# Patient Record
Sex: FEMALE | Race: WHITE | NOT HISPANIC OR LATINO | URBAN - METROPOLITAN AREA
[De-identification: names, ages, dates, MRNs, and addresses within clinical notes are randomized per-mention and may not be internally consistent; named-entity substitution may affect disease eponyms.]

---

## 2023-07-25 ENCOUNTER — TRANSFERRED RECORDS (OUTPATIENT)
Dept: HEALTH INFORMATION MANAGEMENT | Facility: CLINIC | Age: 43
End: 2023-07-25
Payer: COMMERCIAL

## 2023-07-27 ENCOUNTER — TRANSCRIBE ORDERS (OUTPATIENT)
Dept: OTHER | Age: 43
End: 2023-07-27

## 2023-07-27 ENCOUNTER — MEDICAL CORRESPONDENCE (OUTPATIENT)
Dept: HEALTH INFORMATION MANAGEMENT | Facility: CLINIC | Age: 43
End: 2023-07-27
Payer: COMMERCIAL

## 2023-07-27 DIAGNOSIS — M51.379 DDD (DEGENERATIVE DISC DISEASE), LUMBOSACRAL: ICD-10-CM

## 2023-07-27 DIAGNOSIS — M54.50 LOW BACK PAIN: Primary | ICD-10-CM

## 2023-11-17 ENCOUNTER — THERAPY VISIT (OUTPATIENT)
Dept: PHYSICAL THERAPY | Facility: CLINIC | Age: 43
End: 2023-11-17
Payer: COMMERCIAL

## 2023-11-17 DIAGNOSIS — G89.29 CHRONIC BILATERAL LOW BACK PAIN WITHOUT SCIATICA: Primary | ICD-10-CM

## 2023-11-17 DIAGNOSIS — M54.50 CHRONIC BILATERAL LOW BACK PAIN WITHOUT SCIATICA: Primary | ICD-10-CM

## 2023-11-17 PROCEDURE — 97110 THERAPEUTIC EXERCISES: CPT | Mod: GP | Performed by: PHYSICAL THERAPIST

## 2023-11-17 PROCEDURE — 97161 PT EVAL LOW COMPLEX 20 MIN: CPT | Mod: GP | Performed by: PHYSICAL THERAPIST

## 2023-11-17 NOTE — PROGRESS NOTES
PHYSICAL THERAPY EVALUATION  Type of Visit: Evaluation    See electronic medical record for Abuse and Falls Screening details.    Subjective       Presenting condition or subjective complaint: Low back pain for several years and got worse in February.The pain is bad when she wakes up in the morning and feels stiff and makes it hard to get dressed in the morning. She has tried different yoga positions and stretches and those don't seem to help.   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   Prior therapy history for the same diagnosis, illness or injury: No      Prior Level of Function  Transfers: Independent  Ambulation: Independent  ADL: Independent  IADL:     Living Environment  Social support: With a significant other or spouse   Type of home: 2-story   Stairs to enter the home: Yes 5 Is there a railing: Yes   Ramp: No   Stairs inside the home: Yes       Help at home: None  Equipment owned:       Employment: Yes Nurse  Hobbies/Interests: yoga, reading, kids activites    Patient goals for therapy: Bend over to get dressed, sit in softer chairs    Pain assessment: Pain present     Objective   LUMBAR SPINE EVALUATION  PAIN: Pain Level at Rest: 3/10  Pain Level with Use: 8/10  Pain Location: lumbar spine  Pain Quality: Aching  Pain Frequency: constant  INTEGUMENTARY (edema, incisions):   POSTURE:   GAIT:   Weightbearing Status:   Assistive Device(s):   Gait Deviations:   BALANCE/PROPRIOCEPTION:   WEIGHTBEARING ALIGNMENT:   NON-WEIGHTBEARING ALIGNMENT:    ROM:   (Degrees) Left AROM Left PROM  Right AROM Right PROM   Hip Flexion  WNL  WNL   Hip Extension       Hip Abduction       Hip Adduction       Hip Internal Rotation  WNL  WNL   Hip External Rotation  WNL  WNL   Knee Flexion       Knee Extension       Lumbar Side glide NIL pain at end  "of movement NIL pain throughout movement   Lumbar Flexion Hands to ankles, reproduces pain   Lumbar Extension Mod restriction \"feels good\"   Pain:   End feel:   PELVIC/SI SCREEN:   STRENGTH:     MYOTOMES:    Left Right   T12-L3 (Hip Flexion) 5 5   L2-4 (Quads)  5 5   L4 (Ankle DF) 5 5   L5 (Great Toe Ext) 5 5   S1 (Toe Raise) 5 5     DTR S:   CORD SIGNS:     NEURAL TENSION:   FLEXIBILITY:   LUMBAR/HIP Special Tests:    Left Right   MIKEY     FADIR/Labrum/JG     Femoral Nerve     Martha's     Piriformis     Quadrant Testing     SLR Positive Positive   Slump     Stork with Extension     [place]             PELVIS/SI SPECIAL TESTS:   FUNCTIONAL TESTS:   PALPATION:   SPINAL SEGMENTAL CONCLUSIONS:       Assessment & Plan   CLINICAL IMPRESSIONS  Medical Diagnosis: Low back pain  DDD (degenerative disc disease), lumbosacral    Treatment Diagnosis: chronic central low back pain without sciatica   Impression/Assessment: Patient is a 43 year old female with low back pain complaints.  The following significant findings have been identified: Pain and Decreased ROM/flexibility. These impairments interfere with their ability to perform self care tasks, work tasks, and household chores as compared to previous level of function.     Clinical Decision Making (Complexity):  Clinical Presentation: Stable/Uncomplicated  Clinical Presentation Rationale: based on medical and personal factors listed in PT evaluation  Clinical Decision Making (Complexity): Low complexity    PLAN OF CARE  Treatment Interventions:  Interventions: Manual Therapy, Neuromuscular Re-education, Therapeutic Activity, Therapeutic Exercise    Long Term Goals     PT Goal 1  Goal Identifier: bending  Goal Description: pt will have unrestricted bending first thing in the morning 1/10 PL or less  Rationale: to maximize safety and independence with performance of ADLs and functional tasks  Goal Progress: flexion hands to toes with 6/10PL.      Frequency of Treatment: 1 x/ " week  Duration of Treatment: 8 weeks    Recommended Referrals to Other Professionals:   Education Assessment:   Learner/Method: Patient;Demonstration;Pictures/Video;No Barriers to Learning    Risks and benefits of evaluation/treatment have been explained.   Patient/Family/caregiver agrees with Plan of Care.     Evaluation Time:     PT Eval, Low Complexity Minutes (38321): 25       Signing Clinician: José Antonio Lozano PT

## 2023-11-30 ENCOUNTER — THERAPY VISIT (OUTPATIENT)
Dept: PHYSICAL THERAPY | Facility: CLINIC | Age: 43
End: 2023-11-30
Payer: COMMERCIAL

## 2023-11-30 DIAGNOSIS — G89.29 CHRONIC BILATERAL LOW BACK PAIN WITHOUT SCIATICA: Primary | ICD-10-CM

## 2023-11-30 DIAGNOSIS — M54.50 CHRONIC BILATERAL LOW BACK PAIN WITHOUT SCIATICA: Primary | ICD-10-CM

## 2023-11-30 PROCEDURE — 97110 THERAPEUTIC EXERCISES: CPT | Mod: GP | Performed by: PHYSICAL THERAPIST

## 2023-12-07 ENCOUNTER — THERAPY VISIT (OUTPATIENT)
Dept: PHYSICAL THERAPY | Facility: CLINIC | Age: 43
End: 2023-12-07
Payer: COMMERCIAL

## 2023-12-07 DIAGNOSIS — G89.29 CHRONIC BILATERAL LOW BACK PAIN WITHOUT SCIATICA: Primary | ICD-10-CM

## 2023-12-07 DIAGNOSIS — M54.50 CHRONIC BILATERAL LOW BACK PAIN WITHOUT SCIATICA: Primary | ICD-10-CM

## 2023-12-07 PROCEDURE — 97110 THERAPEUTIC EXERCISES: CPT | Mod: GP | Performed by: PHYSICAL THERAPIST

## 2023-12-15 ENCOUNTER — THERAPY VISIT (OUTPATIENT)
Dept: PHYSICAL THERAPY | Facility: CLINIC | Age: 43
End: 2023-12-15
Payer: COMMERCIAL

## 2023-12-15 DIAGNOSIS — M54.42 ACUTE BILATERAL LOW BACK PAIN WITH LEFT-SIDED SCIATICA: Primary | ICD-10-CM

## 2023-12-15 PROBLEM — M54.50 CHRONIC LOW BACK PAIN WITHOUT SCIATICA: Status: RESOLVED | Noted: 2023-11-17 | Resolved: 2023-12-15

## 2023-12-15 PROBLEM — G89.29 CHRONIC LOW BACK PAIN WITHOUT SCIATICA: Status: RESOLVED | Noted: 2023-11-17 | Resolved: 2023-12-15

## 2023-12-15 PROCEDURE — 97110 THERAPEUTIC EXERCISES: CPT | Mod: GP | Performed by: PHYSICAL THERAPIST

## 2023-12-15 NOTE — PROGRESS NOTES
12/15/23 0500   Appointment Info   Signing clinician's name / credentials José Antonio Lozano, JONI, Rosamaria Jaimes, SPT   Total/Authorized Visits 8 E&T   Visits Used 4   Medical Diagnosis Low back pain  DDD (degenerative disc disease), lumbosacral   PT Tx Diagnosis chronic central low back pain without sciatica   Progress Note/Certification   Onset of illness/injury or Date of Surgery 02/01/23   Therapy Frequency 1 x/ week   Predicted Duration 8 weeks   Progress Note Due Date 01/12/24   Progress Note Completed Date 11/17/23   Supervision   Student Supervision Therapy services provided with the co-signing licensed therapist guiding and directing the services, and providing the skilled judgement and assessment throughout the session       Present No   PT Goal 1   Goal Identifier bending   Goal Description pt will have unrestricted bending first thing in the morning 1/10 PL or less   Rationale to maximize safety and independence with performance of ADLs and functional tasks   Goal Progress flexion hands to floor with 1/10 PL. in morning,   Target Date 12/15/23   Date Met 12/15/23   Subjective Report   Subjective Report Pt reports feeling better throughout the day and being able to bend over with no pain. She is still stiff in the morning with some discomfort.   Objective Measures   Objective Measures Objective Measure 1;Objective Measure 2   Objective Measure 1   Objective Measure lumbar ROM   Details LROM FIS nil loss pain present which increases throug flexion force EIS MOD loss SGIS (R) nil loss (L) min loss   Treatment Interventions (PT)   Interventions Therapeutic Procedure/Exercise   Therapeutic Procedure/Exercise   Therapeutic Procedures: strength, endurance, ROM, flexibillity minutes (24066) 38   Ther Proc 1 Prone lying   Ther Proc 1 - Details 2 min   PTRx Ther Proc 1 Prone Press Ups   PTRx Ther Proc 1 - Details No Notes   PTRx Ther Proc 2 Standing Sideglide   PTRx Ther Proc 2 - Details x 20  reps but given education on if symptoms plateau she can have her  add overpressure.    PTRx Ther Proc 3 Bridging   PTRx Ther Proc 3 - Details x20 reps and educated on how to add weight to waist or doing one leg at a time.    PTRx Ther Proc 4 Supine Abdominal Exercise #4 (Leg Extension)   PTRx Ther Proc 4 - Details No Notes   PTRx Ther Proc 5 Supine Abdominal Exercise #5 (Arm and Leg Extension)   PTRx Ther Proc 5 - Details No Notes   PTRx Ther Proc 6 Supine Abdominal Exercise #6 (Dead Bug)   PTRx Ther Proc 6 - Details No Notes   PTRx Ther Proc 7 Supine Abdominal Exercise #7A (Arm Extension with Legs at 90/90)   PTRx Ther Proc 7 - Details No Notes   PTRx Ther Proc 8 Supine Abdominal Exercise #8 (Toe Taps)   PTRx Ther Proc 8 - Details No Notes   PTRx Ther Proc 9 Supine Abdominal Exercise #9A (Arm/Leg Extension With Feet Off the Floor)   PTRx Ther Proc 9 - Details No Notes   PTRx Ther Proc 10 All 4s Leg Lift   PTRx Ther Proc 10 - Details No Notes   PTRx Ther Proc 11 birddog   PTRx Ther Proc 11 - Details No Notes   Skilled Intervention Educated patient on core strength in addition to repeated movement. Instructed on dosing and performing 2x a week. Demonstrated progressions for each exercise and when to progress.   Patient Response/Progress sideglides and core exercises reduced pain throughout day but still 1-2/10 in morning   PTRx Ther Proc 12 Portuguese Deadlift   PTRx Ther Proc 12 - Details No Notes   PTRx Ther Proc 13 Partial Sit-up   PTRx Ther Proc 13 - Details No Notes   PTRx Ther Proc 14 Lower Trunk Extension Off Plinth   PTRx Ther Proc 14 - Details No Notes   PTRx Ther Proc 15 Ball Stabilization Prone Bilateral Lower Extremity Extension   PTRx Ther Proc 15 - Details No Notes   Therapeutic Activity   PTRx Ther Act 1 Posture Correction with Lumbar Roll   PTRx Ther Act 1 - Details educated on posture with use of lumbar roll to avoid flexion x 4 min    Education   Learner/Method  Patient;Demonstration;Pictures/Video;No Barriers to Learning   Education Comments Discussed continuing sideglides and extension as long as they are providing pain relief and improving motion. Instructed on adding a core strengthening program   Plan   Home program Sideglide/extension and core strength   Plan for next session Progress strength as tolerated   Total Session Time   Timed Code Treatment Minutes 38   Total Treatment Time (sum of timed and untimed services) 38       DISCHARGE  Reason for Discharge: Patient has met all goals.    Equipment Issued:     Discharge Plan: Patient to continue home program.    Referring Provider:  IVAN Manley

## 2025-01-19 ENCOUNTER — HEALTH MAINTENANCE LETTER (OUTPATIENT)
Age: 45
End: 2025-01-19